# Patient Record
Sex: MALE | Race: WHITE | URBAN - METROPOLITAN AREA
[De-identification: names, ages, dates, MRNs, and addresses within clinical notes are randomized per-mention and may not be internally consistent; named-entity substitution may affect disease eponyms.]

---
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
## 2018-11-07 ENCOUNTER — TELEPHONE (OUTPATIENT)
Dept: FAMILY MEDICINE | Facility: CLINIC | Age: 18
End: 2018-11-07

## 2018-11-07 NOTE — TELEPHONE ENCOUNTER
LVM- Need more information on his request:    Received a YANNI from patient requesting for us to type a letter stating he outgrew his asthma.    The YANNI is asking for us to to send the letter to [employer], need clarification if patient is looking for a letter or just his office notes that we have on record?    Patient hasn't been seen since 2017 and never for a well child check up or physical. Confirmed with a RN but patient will need to be seen for a physical in order to write the letter.      YANNI is at the [employer] station in the nurse only folder.    Vera Byrne

## 2018-11-28 NOTE — TELEPHONE ENCOUNTER
Sent YANNI to [employer] to get scanned into his chart. Patient never returned our call, assuming he went into a clinic to get the physical he needs. Vera Byrne